# Patient Record
(demographics unavailable — no encounter records)

---

## 2024-10-09 NOTE — HISTORY OF PRESENT ILLNESS
[FreeTextEntry8] : 62-year-old male new patient presents for a hospital discharge follow-up. Past history of hypertension, coronary artery disease, myocardial infarction (2016?), ischemic cardiomyopathy, congestive heart failure with reduced ejection fraction (EF 50%-55% this most recent admission), now status-post AICD and permanent pacemaker, and chronic kidney disease stage three. He reported that he felt tightness and stiffness in his mid-back while he was on his way to a football game on 9/28/2024. He experienced abdominal bloating and diffuse abdominal discomfort. His symptoms resolved in the emergency department. While he was there, he was found to have a deep vein thrombosis in his left lower leg. He reports that he had been experiencing left lower extremity swelling and pain for 3 weeks prior. While in the hospital, MRI detected multiple hepatic hemangiomas up to 9 mm, right hepatic hemangioma of 3.5 cm, negative for suspicious liver mass, along with positive mitochondrial M2 antibody (24.9) and smooth muscle antibody titers (1:20). He had elevated liver enzymes on presentation (, ) which down trended through discharge. Ferritin was elevated at 474 as well. Transferrin 187, iron saturation 14%, low total iron of 30. He was started on Eliquis for DVT prophylaxis. CTA chest showed a chronic-appearing, web-like filling defect within a right lower lobe segmental pulmonary artery compatible with sequela of prior pulmonary embolism. Recommendation to repeat ultrasound of liver in 12 months to assess for hemangioma size. Also recommended to follow-up with colonoscopy. Has appointment with Dr. Morales on November 11. He also has a vascular surgeon that he will be seeing. He needs a referral for nephrologist for his CKD IIIb (GFR < 45). 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 No recollection of hereditary illnesses or related conditions in the family. Daughter had autoimmune disease during pregnancy. Patient does not smoke tobacco. The patient enjoys walking and exercising, and tries to keep an active lifestyle. He also mentioned that he intends to lose weight with goal of 30 more pounds weight loss. The patient did report some itchiness but did not note any other systemic symptoms in the current consultation. He denies dry eyes, difficulties with swallowing, changes in stool, or notable skin changes.

## 2024-10-09 NOTE — PHYSICAL EXAM
[Normal] : no CVA or spinal tenderness [de-identified] : Right lower extremity +1 non-pitting edema, left lower extremity with compression sock up to groin

## 2024-10-09 NOTE — HISTORY OF PRESENT ILLNESS
Patient is informed and verbalizes understanding.    [FreeTextEntry8] : 62-year-old male new patient presents for a hospital discharge follow-up. Past history of hypertension, coronary artery disease, myocardial infarction (2016?), ischemic cardiomyopathy, congestive heart failure with reduced ejection fraction (EF 50%-55% this most recent admission), now status-post AICD and permanent pacemaker, and chronic kidney disease stage three. He reported that he felt tightness and stiffness in his mid-back while he was on his way to a football game on 9/28/2024. He experienced abdominal bloating and diffuse abdominal discomfort. His symptoms resolved in the emergency department. While he was there, he was found to have a deep vein thrombosis in his left lower leg. He reports that he had been experiencing left lower extremity swelling and pain for 3 weeks prior. While in the hospital, MRI detected multiple hepatic hemangiomas up to 9 mm, right hepatic hemangioma of 3.5 cm, negative for suspicious liver mass, along with positive mitochondrial M2 antibody (24.9) and smooth muscle antibody titers (1:20). He had elevated liver enzymes on presentation (, ) which down trended through discharge. Ferritin was elevated at 474 as well. Transferrin 187, iron saturation 14%, low total iron of 30. He was started on Eliquis for DVT prophylaxis. CTA chest showed a chronic-appearing, web-like filling defect within a right lower lobe segmental pulmonary artery compatible with sequela of prior pulmonary embolism. Recommendation to repeat ultrasound of liver in 12 months to assess for hemangioma size. Also recommended to follow-up with colonoscopy. Has appointment with Dr. Morales on November 11. He also has a vascular surgeon that he will be seeing. He needs a referral for nephrologist for his CKD IIIb (GFR < 45). 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 No recollection of hereditary illnesses or related conditions in the family. Daughter had autoimmune disease during pregnancy. Patient does not smoke tobacco. The patient enjoys walking and exercising, and tries to keep an active lifestyle. He also mentioned that he intends to lose weight with goal of 30 more pounds weight loss. The patient did report some itchiness but did not note any other systemic symptoms in the current consultation. He denies dry eyes, difficulties with swallowing, changes in stool, or notable skin changes.

## 2024-10-09 NOTE — PHYSICAL EXAM
[Normal] : no CVA or spinal tenderness [de-identified] : Right lower extremity +1 non-pitting edema, left lower extremity with compression sock up to groin

## 2024-11-11 NOTE — HISTORY OF PRESENT ILLNESS
[FreeTextEntry1] : Date: October 3, 2024  INTERPRETATION:  CLINICAL INFORMATION: Multiple liver masses on ultrasound for further evaluation. Abdominal pain.  COMPARISON: CT 9/20/2024 and ultrasound 9/30/2024  CONTRAST/COMPLICATIONS: IV Contrast: Gadavist Oral Contrast: NONE Complications: None reported at time of study completion  PROCEDURE: MRI of the abdomen was performed. MRCP was performed.  FINDINGS:  LIVER: Normal in size and morphology. No steatosis. A T2 hyperintense 3.5 cm segment 7 right hepatic mass is consistent with a hemangioma. Few small additional segment 6 hemangiomas measure up to 9 mm. No suspicious liver mass. BILE DUCTS: Normal caliber. GALLBLADDER: Within normal limits. SPLEEN: Within normal limits. PANCREAS: Within normal limits. ADRENALS: Within normal limits. KIDNEYS/URETERS: Small bilateral renal cysts. No hydronephrosis.  VISUALIZED PORTIONS: BOWEL: Colonic diverticulosis. PERITONEUM: No ascites. VESSELS: Within normal limits. Hepatic and portal veins and SMV are patent. RETROPERITONEUM/LYMPH NODES: No lymphadenopathy. ABDOMINAL WALL: Within normal limits.  IMPRESSION: Hepatic hemangiomata. No suspicious liver mass.    --- End of Report ---

## 2024-11-11 NOTE — ASSESSMENT
[FreeTextEntry1] : I advised the patient that the liver lesions were benign hemangiomata. No intervention needed for these. Liver tests are normal at present. Doubt PBC so would not recheck AMA at this time. Can monitor liver panel with routine labs.  Advised him to keep appointment with hematologist to discuss management of anticoagulation.  He will need colon cancer screening. I recommended colonoscopy when he can safely hold Eliquis but this should be discussed with his hematologist. I recommended that he follow up with me in March 2025. He agrees,

## 2024-11-11 NOTE — PHYSICAL EXAM
[Alert] : alert [Normal Voice/Communication] : normal voice/communication [No Acute Distress] : no acute distress [Obese (BMI >= 30)] : obese (BMI >= 30) [Sclera] : the sclera and conjunctiva were normal [Hearing Threshold Finger Rub Not [place]] : hearing was normal [Normal Lips/Gums] : the lips and gums were normal [Oropharynx] : the oropharynx was normal [Normal Appearance] : the appearance of the neck was normal [No Neck Mass] : no neck mass was observed [No Respiratory Distress] : no respiratory distress [No Acc Muscle Use] : no accessory muscle use [Respiration, Rhythm And Depth] : normal respiratory rhythm and effort [Auscultation Breath Sounds / Voice Sounds] : lungs were clear to auscultation bilaterally [Heart Rate And Rhythm] : heart rate was normal and rhythm regular [Normal S1, S2] : normal S1 and S2 [Pacemaker/Defibrillator] : Patient has pacemaker/defibrillator [Bowel Sounds] : normal bowel sounds [Abdomen Tenderness] : non-tender [No Masses] : no abdominal mass palpated [Abdomen Soft] : soft [] : no hepatosplenomegaly [No CVA Tenderness] : no CVA  tenderness [Involuntary Movements] : no involuntary movements were seen [Normal Color / Pigmentation] : normal skin color and pigmentation [No Focal Deficits] : no focal deficits [Oriented To Time, Place, And Person] : oriented to person, place, and time

## 2024-12-23 NOTE — HISTORY OF PRESENT ILLNESS
[de-identified] : 62-year-old male admitted to [facility] September 28, 2024, and remained 7 days. Left leg thrombosis and an "old clot in lung" left chamber. He was treated with Eliquis 10 mg PO BID then switched down to 5 mg PO BID and he has remained on the medication. There were two hospitalizations over one week; re admission because of back pains.  No prior history of blood clots. No prior family history of blood clot formation. Dr Gaviria of vascular surgery recommended a hematology consultation The patient has never been told of diabetes, yet he is taking Jardiance for his heart.(Prescrivbed by a [facility] physician)  [phone] [FreeTextEntry1] : Eliquis 5 mg PO BID [de-identified] : 12/23/2024 since initial visit he has been feeling well and no hospitalization and no bleeding. He has been on apixaban and initial anticoagulating for nearly 90 days. cardiology has not decided upon duration of anticoagulation

## 2024-12-23 NOTE — ASSESSMENT
[FreeTextEntry1] :  Kang Macias is a 62-year-old male with a history of morbid obesity, advanced heart failure, kidney disease with protein excretion, hypertension, and a prior myocardial infraction. On September 28, 2024, he was admitted to [facility], and he was found to have a left leg deep venous thrombosis. The CT pulmonary angiogram showed no acute pulmonary embolism, but it did show vascular webbing. Radiology speculated that he might have had a prior pulmonary embolism although the patient does not ever remember having that diagnosis. He has been taking Eliquis 5 mg PO BID since late September 2024 without bleeding. He was informed of the bleeding risk of Eliquis use. The patient is a nonsmoker, he is not on recent or frequent aircraft flights, there is no family history of thrombosis. There is no diagnosis of malignancy, surgery or radiation therapy. The has heart failure and morbid obesity; these two risk factors are provocative factors for deep venous thrombosis. He is advised to lose weight and will need to be on Eliquis for three months post initial event. He is to be referred to nutrition service to advise weight reduction diet. We do not have a definitive diagnosis of a prior pulmonary embolism, nor does the patient recollect this diagnosis or symptoms. No hospitalization for prior pulmonary embolism is remembered by patient. If he does develop a subsequent DVT or PE, he will need to be on anticoagulation indefinitely.   genetic risk for DVT including anticardiolipin, beta 2 antibody testing, activated Protein C activity, resistance, protein S antigen, Protein S activity, MICHAEL 2,anti-thrombin 3 level. All testing weas reported as normal. Cause of the pulmonary finding of webbing is difficult. 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 We are not certain if he had had a prior pulmonary embolism. He elects to discontinue apixaban 5 mg POI BID at this time; I have offered an additional 3 months of therapy, but he refuses and in fact the September pulmonary Angio states that there is n acute pulmonary embolism.  RTC in 3 months to review new imaging studies inclining a US of the left lower extremity. All questions of the patient were answered to his satisfaction.

## 2024-12-23 NOTE — CONSULT LETTER
[FreeTextEntry2] : Jordan Santamaria  [address] [address] [facility] [id number] [FreeTextEntry3] : Anthony Stafford MD

## 2024-12-23 NOTE — REVIEW OF SYSTEMS
[Fever] : no fever [Chills] : no chills [Night Sweats] : no night sweats [Fatigue] : no fatigue [Recent Change In Weight] : ~T no recent weight change [Eye Pain] : no eye pain [Red Eyes] : eyes not red [Dry Eyes] : no dryness of the eyes [Chest Pain] : no chest pain [Palpitations] : no palpitations [Leg Claudication] : no intermittent leg claudication [Lower Ext Edema] : no lower extremity edema [Shortness Of Breath] : no shortness of breath [Wheezing] : no wheezing [Cough] : no cough [SOB on Exertion] : no shortness of breath during exertion [Abdominal Pain] : no abdominal pain [Vomiting] : no vomiting [Constipation] : no constipation [Dysuria] : no dysuria [Incontinence] : no incontinence [Joint Pain] : no joint pain [Joint Stiffness] : no joint stiffness [Muscle Pain] : no muscle pain [Skin Rash] : no skin rash [Skin Wound] : no skin wound [Confused] : no confusion [Dizziness] : no dizziness [Fainting] : no fainting [Difficulty Walking] : no difficulty walking [Suicidal] : not suicidal [Insomnia] : no insomnia [Anxiety] : no anxiety [Depression] : no depression [Proptosis] : no proptosis [Hot Flashes] : no hot flashes [Muscle Weakness] : no muscle weakness [Deepening Of The Voice] : no deepening of the voice [Easy Bleeding] : no tendency for easy bleeding [Easy Bruising] : no tendency for easy bruising [Swollen Glands] : no swollen glands [FreeTextEntry2] : dieting 30 lbs weight loss [FreeTextEntry3] : wears corrective lens [FreeTextEntry4] : epistaxis in august [FreeTextEntry5] : defibrillatory pacer post MI 2016

## 2024-12-23 NOTE — REASON FOR VISIT
[FreeTextEntry2] : follow up for left leg DVT, heart failure and obesity; possible hypercoagulation syndrome

## 2024-12-23 NOTE — RESULTS/DATA
[FreeTextEntry1] : CBC reviewed and normal blood testing including anticardiolipin antibody normal beta 2 screen, normal APC resistance, normal protein S antigen and activity, normal antithrobin 3

## 2025-03-17 NOTE — REVIEW OF SYSTEMS
[Fever] : no fever [Chills] : no chills [Night Sweats] : no night sweats [Fatigue] : no fatigue [Recent Change In Weight] : ~T no recent weight change [Eye Pain] : no eye pain [Red Eyes] : eyes not red [Dry Eyes] : no dryness of the eyes [Vision Problems] : vision problems [Nosebleeds] : nosebleeds [Chest Pain] : no chest pain [Palpitations] : no palpitations [Leg Claudication] : no intermittent leg claudication [Lower Ext Edema] : no lower extremity edema [Shortness Of Breath] : no shortness of breath [Wheezing] : no wheezing [Cough] : no cough [SOB on Exertion] : no shortness of breath during exertion [Abdominal Pain] : no abdominal pain [Vomiting] : no vomiting [Constipation] : no constipation [Diarrhea: Grade 0] : Diarrhea: Grade 0 [Dysuria] : no dysuria [Incontinence] : no incontinence [Joint Pain] : no joint pain [Joint Stiffness] : no joint stiffness [Muscle Pain] : no muscle pain [Skin Rash] : no skin rash [Skin Wound] : no skin wound [Confused] : no confusion [Dizziness] : no dizziness [Fainting] : no fainting [Difficulty Walking] : no difficulty walking [Suicidal] : not suicidal [Insomnia] : no insomnia [Anxiety] : no anxiety [Depression] : no depression [Proptosis] : no proptosis [Hot Flashes] : no hot flashes [Muscle Weakness] : no muscle weakness [Deepening Of The Voice] : no deepening of the voice [Easy Bleeding] : no tendency for easy bleeding [Easy Bruising] : no tendency for easy bruising [Swollen Glands] : no swollen glands [Negative] : Allergic/Immunologic [FreeTextEntry2] : dieting 30 lbs weight loss [FreeTextEntry3] : wears corrective lens [FreeTextEntry4] : epistaxis in august [FreeTextEntry5] : defibrillatory pacer post MI 2016

## 2025-03-17 NOTE — ASSESSMENT
[FreeTextEntry1] :  Kang Macias is a 62-year-old male with a history of morbid obesity, advanced heart failure, kidney disease with protein excretion, hypertension, and a prior myocardial infraction. On September 28, 2024, he was admitted to [facility], and he was found to have a left leg deep venous thrombosis. The CT pulmonary angiogram showed no acute pulmonary embolism, but it did show vascular webbing. Radiology speculated that he might have had a prior pulmonary embolism although the patient does not ever remember having that diagnosis. He has been taking Eliquis 5 mg PO BID since late September 2024 without bleeding. He was informed of the bleeding risk of Eliquis use. The patient is a nonsmoker, he is not on recent or frequent aircraft flights, there is no family history of thrombosis. There is no diagnosis of malignancy, surgery or radiation therapy. The has heart failure and morbid obesity; these two risk factors are provocative factors for deep venous thrombosis. He is advised to lose weight and will need to be on Eliquis for three months post initial event. He is to be referred to nutrition service to advise weight reduction diet. We do not have a definitive diagnosis of a prior pulmonary embolism, nor does the patient recollect this diagnosis or symptoms. No hospitalization for prior pulmonary embolism is remembered by patient. If he does develop a subsequent DVT or PE, he will need to be on anticoagulation indefinitely.   genetic risk for DVT including anticardiolipin, beta 2 antibody testing, activated Protein C activity, resistance, protein S antigen, Protein S activity, MICHAEL 2,anti-thrombin 3 level. All testing weas reported as normal. Cause of the pulmonary finding of webbing is difficult. 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 We are not certain if he had had a prior pulmonary embolism. He elects to discontinue apixaban 5 mg POI BID at this time; I have offered an additional 3 months of therapy, but he refuses and in fact the September pulmonary Angio states that there is n acute pulmonary embolism.  RTC in 3 months to review new imaging studies inclining a US of the left lower extremity. All questions of the patient were answered to his satisfaction.  [Supportive] : Goals of care discussed with patient: Supportive [Palliative Care Plan] : not applicable at this time

## 2025-03-17 NOTE — HISTORY OF PRESENT ILLNESS
[de-identified] : 62-year-old male admitted to [facility] September 28, 2024, and remained 7 days. Left leg thrombosis and an "old clot in lung" left chamber. He was treated with Eliquis 10 mg PO BID then switched down to 5 mg PO BID and he has remained on the medication. There were two hospitalizations over one week; re admission because of back pains.  No prior history of blood clots. No prior family history of blood clot formation. Dr Gaviria of vascular surgery recommended a hematology consultation The patient has never been told of diabetes, yet he is taking Jardiance for his heart.(Prescrivbed by a [facility] physician)  [phone] [FreeTextEntry1] : Eliquis 5 mg PO BID [de-identified] : 12/23/2024 since initial visit he has been feeling well and no hospitalization and no bleeding. He has been on apixaban and initial anticoagulating for nearly 90 days. cardiology has not decided upon duration of anticoagulation [Date: ____________] : Patient's last distress assessment performed on [unfilled]. [0 - No Distress] : Distress Level: 0 [100: Normal, no complaints, no evidence of disease.] : 100: Normal, no complaints, no evidence of disease. [ECOG Performance Status: 0 - Fully active, able to carry on all pre-disease performance without restriction] : Performance Status: 0 - Fully active, able to carry on all pre-disease performance without restriction

## 2025-03-17 NOTE — REASON FOR VISIT
[Initial Consultation] : an initial consultation for [Other: _____] : [unfilled] [FreeTextEntry2] : follow up for left leg DVT, heart failure and obesity; possible hypercoagulation syndrome

## 2025-03-17 NOTE — CONSULT LETTER
[Dear  ___] : Dear  [unfilled], [Please see my note below.] : Please see my note below. [Sincerely,] : Sincerely, [FreeTextEntry2] : Jordan Santamaria  [address] [facility] [id number] [FreeTextEntry3] : Anthony Stafford MD

## 2025-03-30 NOTE — HISTORY OF PRESENT ILLNESS
[FreeTextEntry1] : Follow up visit for colon screening discussion. He has been seeing hematology and also saw his vascular surgeon Dr. Gaviria 1 month ago. Dr. Gaviria advised the patient that there is still residual DVT and he was advised to stay on Eliquis. Has defibrillator, follows with Dr. Cope at [place]. Denies rectal bleeding, melena, n/v.

## 2025-03-30 NOTE — ASSESSMENT
[FreeTextEntry1] : Had a discussion with the patient. Given the significant comorbid conditions and the need to continue anticoagulation with Eliquis, I recommended that we pursue Cologuard testing. If the Cologuard is negative he will have been appropriately screened for colon cancer for 3 years. If positive, will need to coordinate colonoscopy with his outside specialists to minimize risks. He agrees to this plan and signed Cologuard request in the office.

## 2025-03-30 NOTE — PHYSICAL EXAM
[Alert] : alert [Normal Voice/Communication] : normal voice/communication [No Acute Distress] : no acute distress [Obese (BMI >= 30)] : obese (BMI >= 30) [Sclera] : the sclera and conjunctiva were normal [Hearing Threshold Finger Rub Not [place]] : hearing was normal [Normal Lips/Gums] : the lips and gums were normal [Oropharynx] : the oropharynx was normal [Normal Appearance] : the appearance of the neck was normal [No Neck Mass] : no neck mass was observed [No Respiratory Distress] : no respiratory distress [No Acc Muscle Use] : no accessory muscle use [Respiration, Rhythm And Depth] : normal respiratory rhythm and effort [Auscultation Breath Sounds / Voice Sounds] : lungs were clear to auscultation bilaterally [Heart Rate And Rhythm] : heart rate was normal and rhythm regular [Normal S1, S2] : normal S1 and S2 [Pacemaker/Defibrillator] : Patient has pacemaker/defibrillator [Bowel Sounds] : normal bowel sounds [Abdomen Tenderness] : non-tender [No Masses] : no abdominal mass palpated [Abdomen Soft] : soft [] : no hepatosplenomegaly [No CVA Tenderness] : no CVA  tenderness [Involuntary Movements] : no involuntary movements were seen [Normal Color / Pigmentation] : normal skin color and pigmentation [No Focal Deficits] : no focal deficits [Oriented To Time, Place, And Person] : oriented to person, place, and time [de-identified] : ICD

## 2025-03-30 NOTE — PHYSICAL EXAM
[Alert] : alert [Normal Voice/Communication] : normal voice/communication [No Acute Distress] : no acute distress [Obese (BMI >= 30)] : obese (BMI >= 30) [Sclera] : the sclera and conjunctiva were normal [Hearing Threshold Finger Rub Not [place]] : hearing was normal [Normal Lips/Gums] : the lips and gums were normal [Oropharynx] : the oropharynx was normal [Normal Appearance] : the appearance of the neck was normal [No Neck Mass] : no neck mass was observed [No Respiratory Distress] : no respiratory distress [No Acc Muscle Use] : no accessory muscle use [Respiration, Rhythm And Depth] : normal respiratory rhythm and effort [Auscultation Breath Sounds / Voice Sounds] : lungs were clear to auscultation bilaterally [Heart Rate And Rhythm] : heart rate was normal and rhythm regular [Normal S1, S2] : normal S1 and S2 [Pacemaker/Defibrillator] : Patient has pacemaker/defibrillator [Bowel Sounds] : normal bowel sounds [Abdomen Tenderness] : non-tender [No Masses] : no abdominal mass palpated [Abdomen Soft] : soft [] : no hepatosplenomegaly [No CVA Tenderness] : no CVA  tenderness [Involuntary Movements] : no involuntary movements were seen [Normal Color / Pigmentation] : normal skin color and pigmentation [No Focal Deficits] : no focal deficits [Oriented To Time, Place, And Person] : oriented to person, place, and time [de-identified] : ICD

## 2025-03-30 NOTE — HISTORY OF PRESENT ILLNESS
[FreeTextEntry1] : Follow up visit for colon screening discussion. He has been seeing hematology and also saw his vascular surgeon Dr. Gaviria [address] ago[address] Gaviria advised the patient that there is still residual DVT and he was advised to stay on Eliquis. Has defibrillator, follows with Dr. Cope at [place]. Denies rectal bleeding, melena, n/v.